# Patient Record
Sex: MALE | URBAN - NONMETROPOLITAN AREA
[De-identification: names, ages, dates, MRNs, and addresses within clinical notes are randomized per-mention and may not be internally consistent; named-entity substitution may affect disease eponyms.]

---

## 2019-11-11 NOTE — PROCEDURE NOTE: SURGICAL
"Prior to commencing surgery patient identification, surgical procedure, site, and side were confirmed by Dr. Js Cruz. Following topical proparacaine anesthesia, the patient was positioned at the YAG laser, a contact lens coupled to the cornea of the right eye with methylcellulose and an axial posterior capsulotomy performed without complication using 3.1 Mj x 47. Attention was then turned to the left eye and a contact lens coupled to the cornea of the left eye with methylcellulose and an axial posterior capsulotomy performed without complication using 3.4 Mj x 42.&nbsp; Excess methylcellulose was washed from both eyes, one drop of Alphagan instilled in each eye and the patient returned to the holding area having tolerated the procedure well and without complication. <br />ALW:[id number]<RI /><span style=""font-size:12px;""><br /></span><br />"

## 2019-11-26 ENCOUNTER — IMPORTED ENCOUNTER (OUTPATIENT)
Dept: URBAN - NONMETROPOLITAN AREA CLINIC 1 | Facility: CLINIC | Age: 69
End: 2019-11-26

## 2019-11-26 PROBLEM — H52.03: Noted: 2019-11-26

## 2019-11-26 PROBLEM — H25.13: Noted: 2019-11-26

## 2019-11-26 PROBLEM — H43.813: Noted: 2019-11-26

## 2019-11-26 PROBLEM — H52.221: Noted: 2019-11-26

## 2019-11-26 PROBLEM — H52.4: Noted: 2019-11-26

## 2019-11-26 PROCEDURE — 92015 DETERMINE REFRACTIVE STATE: CPT

## 2019-11-26 PROCEDURE — 92004 COMPRE OPH EXAM NEW PT 1/>: CPT

## 2019-11-26 NOTE — PATIENT DISCUSSION
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 -  S&S of RD/RT reviewed with pt. -  Stressed that pt should contact our office right away with any changes or increase in symptoms.-  RTC 1 year or prn; 's Notes: MR 11/26/2019DFE 11/26/2019

## 2021-02-15 NOTE — PATIENT DISCUSSION
DISC PHOTOS STABLE OU.  CONTINUE LUMIGAN.  PT RECEIVING EPIDURAL STEROID INJECTIONS.  ADVISED CAN RAISE IOP.

## 2021-06-28 NOTE — PATIENT DISCUSSION
Rx for Lumigan sent to [facility] pharmacy for 90 day supply with refills - DS.

## 2022-04-16 ASSESSMENT — TONOMETRY
OD_IOP_MMHG: 17
OS_IOP_MMHG: 17

## 2022-04-16 ASSESSMENT — VISUAL ACUITY
OS_SC: 20/25+2
OD_GLARE: 20/50
OS_GLARE: 20/50
OD_SC: 20/25